# Patient Record
Sex: MALE | Race: WHITE | Employment: FULL TIME | URBAN - METROPOLITAN AREA
[De-identification: names, ages, dates, MRNs, and addresses within clinical notes are randomized per-mention and may not be internally consistent; named-entity substitution may affect disease eponyms.]

---

## 2019-11-15 DIAGNOSIS — Z86.2 PERSONAL HISTORY OF DISEASES OF BLOOD AND BLOOD-FORMING ORGANS: ICD-10-CM

## 2019-11-15 DIAGNOSIS — Z52.001 STEM CELL DONOR: ICD-10-CM

## 2019-11-19 ENCOUNTER — OFFICE VISIT (OUTPATIENT)
Dept: TRANSPLANT | Facility: CLINIC | Age: 64
End: 2019-11-19
Attending: INTERNAL MEDICINE

## 2019-11-19 ENCOUNTER — MEDICAL CORRESPONDENCE (OUTPATIENT)
Dept: TRANSPLANT | Facility: CLINIC | Age: 64
End: 2019-11-19

## 2019-11-19 ENCOUNTER — HOSPITAL ENCOUNTER (OUTPATIENT)
Dept: LAB | Facility: CLINIC | Age: 64
Discharge: HOME | End: 2019-11-19
Attending: INTERNAL MEDICINE | Admitting: INTERNAL MEDICINE

## 2019-11-19 ENCOUNTER — ANCILLARY PROCEDURE (OUTPATIENT)
Dept: GENERAL RADIOLOGY | Facility: CLINIC | Age: 64
End: 2019-11-19
Attending: INTERNAL MEDICINE
Payer: COMMERCIAL

## 2019-11-19 ENCOUNTER — OFFICE VISIT (OUTPATIENT)
Dept: TRANSPLANT | Facility: CLINIC | Age: 64
End: 2019-11-19
Attending: PHYSICIAN ASSISTANT

## 2019-11-19 ENCOUNTER — OFFICE VISIT (OUTPATIENT)
Dept: TRANSPLANT | Facility: CLINIC | Age: 64
End: 2019-11-19
Attending: INTERNAL MEDICINE
Payer: COMMERCIAL

## 2019-11-19 VITALS
DIASTOLIC BLOOD PRESSURE: 73 MMHG | TEMPERATURE: 98.9 F | RESPIRATION RATE: 16 BRPM | HEIGHT: 70 IN | HEART RATE: 69 BPM | OXYGEN SATURATION: 96 % | WEIGHT: 223.33 LBS | BODY MASS INDEX: 31.97 KG/M2 | SYSTOLIC BLOOD PRESSURE: 172 MMHG

## 2019-11-19 VITALS
BODY MASS INDEX: 31.97 KG/M2 | DIASTOLIC BLOOD PRESSURE: 102 MMHG | RESPIRATION RATE: 16 BRPM | SYSTOLIC BLOOD PRESSURE: 169 MMHG | HEART RATE: 66 BPM | TEMPERATURE: 98.9 F | WEIGHT: 223.33 LBS | HEIGHT: 70 IN

## 2019-11-19 DIAGNOSIS — Z86.2 PERSONAL HISTORY OF DISEASES OF BLOOD AND BLOOD-FORMING ORGANS: ICD-10-CM

## 2019-11-19 DIAGNOSIS — Z52.001 DONOR OF STEM CELL: Primary | ICD-10-CM

## 2019-11-19 DIAGNOSIS — Z52.001 STEM CELL DONOR: ICD-10-CM

## 2019-11-19 LAB
ABO + RH BLD: NORMAL
ABO + RH BLD: NORMAL
ALBUMIN SERPL-MCNC: 4.2 G/DL (ref 3.4–5)
ALBUMIN UR-MCNC: NEGATIVE MG/DL
ALP SERPL-CCNC: 79 U/L (ref 40–150)
ALT SERPL W P-5'-P-CCNC: 25 U/L (ref 0–70)
ANION GAP SERPL CALCULATED.3IONS-SCNC: 4 MMOL/L (ref 3–14)
APPEARANCE UR: CLEAR
APTT PPP: 32 SEC (ref 22–37)
AST SERPL W P-5'-P-CCNC: 18 U/L (ref 0–45)
BASOPHILS # BLD AUTO: 0 10E9/L (ref 0–0.2)
BASOPHILS NFR BLD AUTO: 0.8 %
BILIRUB SERPL-MCNC: 0.8 MG/DL (ref 0.2–1.3)
BILIRUB UR QL STRIP: NEGATIVE
BLD GP AB SCN SERPL QL: NORMAL
BLOOD BANK CMNT PATIENT-IMP: NORMAL
BUN SERPL-MCNC: 19 MG/DL (ref 7–30)
CALCIUM SERPL-MCNC: 9.2 MG/DL (ref 8.5–10.1)
CHLORIDE SERPL-SCNC: 107 MMOL/L (ref 94–109)
CO2 SERPL-SCNC: 27 MMOL/L (ref 20–32)
COLOR UR AUTO: NORMAL
CREAT SERPL-MCNC: 0.93 MG/DL (ref 0.66–1.25)
DIFFERENTIAL METHOD BLD: NORMAL
EOSINOPHIL # BLD AUTO: 0.1 10E9/L (ref 0–0.7)
EOSINOPHIL NFR BLD AUTO: 2.1 %
ERYTHROCYTE [DISTWIDTH] IN BLOOD BY AUTOMATED COUNT: 12.8 % (ref 10–15)
GFR SERPL CREATININE-BSD FRML MDRD: 87 ML/MIN/{1.73_M2}
GLUCOSE SERPL-MCNC: 101 MG/DL (ref 70–99)
GLUCOSE UR STRIP-MCNC: NEGATIVE MG/DL
HCT VFR BLD AUTO: 45.7 % (ref 40–53)
HGB BLD-MCNC: 15.2 G/DL (ref 13.3–17.7)
HGB UR QL STRIP: NEGATIVE
IMM GRANULOCYTES # BLD: 0 10E9/L (ref 0–0.4)
IMM GRANULOCYTES NFR BLD: 0.6 %
INR PPP: 0.98 (ref 0.86–1.14)
INTERPRETATION ECG - MUSE: NORMAL
KETONES UR STRIP-MCNC: NEGATIVE MG/DL
LEUKOCYTE ESTERASE UR QL STRIP: NEGATIVE
LYMPHOCYTES # BLD AUTO: 1.2 10E9/L (ref 0.8–5.3)
LYMPHOCYTES NFR BLD AUTO: 25.7 %
MCH RBC QN AUTO: 29.3 PG (ref 26.5–33)
MCHC RBC AUTO-ENTMCNC: 33.3 G/DL (ref 31.5–36.5)
MCV RBC AUTO: 88 FL (ref 78–100)
MONOCYTES # BLD AUTO: 0.4 10E9/L (ref 0–1.3)
MONOCYTES NFR BLD AUTO: 7.2 %
NEUTROPHILS # BLD AUTO: 3.1 10E9/L (ref 1.6–8.3)
NEUTROPHILS NFR BLD AUTO: 63.6 %
NITRATE UR QL: NEGATIVE
NRBC # BLD AUTO: 0 10*3/UL
NRBC BLD AUTO-RTO: 0 /100
PH UR STRIP: 7 PH (ref 5–7)
PLATELET # BLD AUTO: 220 10E9/L (ref 150–450)
POTASSIUM SERPL-SCNC: 4 MMOL/L (ref 3.4–5.3)
PROT SERPL-MCNC: 7.2 G/DL (ref 6.8–8.8)
RBC # BLD AUTO: 5.19 10E12/L (ref 4.4–5.9)
RBC #/AREA URNS AUTO: 0 /HPF (ref 0–2)
SODIUM SERPL-SCNC: 138 MMOL/L (ref 133–144)
SOURCE: NORMAL
SP GR UR STRIP: 1.01 (ref 1–1.03)
SPECIMEN EXP DATE BLD: NORMAL
UROBILINOGEN UR STRIP-MCNC: 0 MG/DL (ref 0–2)
WBC # BLD AUTO: 4.8 10E9/L (ref 4–11)
WBC #/AREA URNS AUTO: 0 /HPF (ref 0–5)

## 2019-11-19 PROCEDURE — 86644 CMV ANTIBODY: CPT | Performed by: INTERNAL MEDICINE

## 2019-11-19 PROCEDURE — 87521 HEPATITIS C PROBE&RVRS TRNSC: CPT | Performed by: INTERNAL MEDICINE

## 2019-11-19 PROCEDURE — 85610 PROTHROMBIN TIME: CPT | Performed by: INTERNAL MEDICINE

## 2019-11-19 PROCEDURE — 86665 EPSTEIN-BARR CAPSID VCA: CPT | Performed by: INTERNAL MEDICINE

## 2019-11-19 PROCEDURE — 85025 COMPLETE CBC W/AUTO DIFF WBC: CPT | Performed by: INTERNAL MEDICINE

## 2019-11-19 PROCEDURE — 93010 ELECTROCARDIOGRAM REPORT: CPT | Mod: ZP | Performed by: INTERNAL MEDICINE

## 2019-11-19 PROCEDURE — G0463 HOSPITAL OUTPT CLINIC VISIT: HCPCS | Mod: 25,ZF

## 2019-11-19 PROCEDURE — 86703 HIV-1/HIV-2 1 RESULT ANTBDY: CPT | Performed by: INTERNAL MEDICINE

## 2019-11-19 PROCEDURE — G0463 HOSPITAL OUTPT CLINIC VISIT: HCPCS | Mod: ZF

## 2019-11-19 PROCEDURE — 86780 TREPONEMA PALLIDUM: CPT | Performed by: INTERNAL MEDICINE

## 2019-11-19 PROCEDURE — 83021 HEMOGLOBIN CHROMOTOGRAPHY: CPT | Performed by: INTERNAL MEDICINE

## 2019-11-19 PROCEDURE — 86901 BLOOD TYPING SEROLOGIC RH(D): CPT | Performed by: INTERNAL MEDICINE

## 2019-11-19 PROCEDURE — 86900 BLOOD TYPING SEROLOGIC ABO: CPT | Performed by: INTERNAL MEDICINE

## 2019-11-19 PROCEDURE — 86695 HERPES SIMPLEX TYPE 1 TEST: CPT | Performed by: INTERNAL MEDICINE

## 2019-11-19 PROCEDURE — 87535 HIV-1 PROBE&REVERSE TRNSCRPJ: CPT | Performed by: INTERNAL MEDICINE

## 2019-11-19 PROCEDURE — 36415 COLL VENOUS BLD VENIPUNCTURE: CPT

## 2019-11-19 PROCEDURE — 85730 THROMBOPLASTIN TIME PARTIAL: CPT | Performed by: INTERNAL MEDICINE

## 2019-11-19 PROCEDURE — 86687 HTLV-I ANTIBODY: CPT | Performed by: INTERNAL MEDICINE

## 2019-11-19 PROCEDURE — 86704 HEP B CORE ANTIBODY TOTAL: CPT | Performed by: INTERNAL MEDICINE

## 2019-11-19 PROCEDURE — 40000803 ZZHCL STATISTIC DNA ISOL HIGH PURITY: Performed by: INTERNAL MEDICINE

## 2019-11-19 PROCEDURE — 86850 RBC ANTIBODY SCREEN: CPT | Performed by: INTERNAL MEDICINE

## 2019-11-19 PROCEDURE — 86753 PROTOZOA ANTIBODY NOS: CPT | Performed by: INTERNAL MEDICINE

## 2019-11-19 PROCEDURE — 80053 COMPREHEN METABOLIC PANEL: CPT | Performed by: INTERNAL MEDICINE

## 2019-11-19 PROCEDURE — 86696 HERPES SIMPLEX TYPE 2 TEST: CPT | Performed by: INTERNAL MEDICINE

## 2019-11-19 PROCEDURE — 86803 HEPATITIS C AB TEST: CPT | Performed by: INTERNAL MEDICINE

## 2019-11-19 PROCEDURE — 87798 DETECT AGENT NOS DNA AMP: CPT | Performed by: INTERNAL MEDICINE

## 2019-11-19 PROCEDURE — 81001 URINALYSIS AUTO W/SCOPE: CPT | Performed by: INTERNAL MEDICINE

## 2019-11-19 PROCEDURE — 40000268 ZZH STATISTIC NO CHARGES: Mod: ZF

## 2019-11-19 PROCEDURE — 87516 HEPATITIS B DNA AMP PROBE: CPT | Performed by: INTERNAL MEDICINE

## 2019-11-19 PROCEDURE — 87340 HEPATITIS B SURFACE AG IA: CPT | Performed by: INTERNAL MEDICINE

## 2019-11-19 ASSESSMENT — MIFFLIN-ST. JEOR
SCORE: 1818.63
SCORE: 1818.63

## 2019-11-19 ASSESSMENT — PAIN SCALES - GENERAL: PAINLEVEL: NO PAIN (0)

## 2019-11-19 NOTE — NURSING NOTE
EKG done, patient tolerated it well. Results transmitted to [facility]

## 2019-11-19 NOTE — PROGRESS NOTES
Discussed GCSF and apheresis procedure for PBSC donation. Provided written handouts and reviewed donor consent, apheresis informational sheet, collection calender and side effects associated with GCSF and apheresis. Patient verbalized understanding. All questions were answered.

## 2019-11-19 NOTE — PROGRESS NOTES
BMT Donor History and Physical    Deangelo Fish MRN# [id number]   Age: 64 year old YOB: 1955            Assessment and Plan     Potential related donor for brother Shai Fish who has AML.  Deangelo will donate peripheral blood stems cells.  G-CSF and Apheresis  has been fully reviewed with the patient.The  donor protocol, risks, and benefits,  and consent form were reviewed with the patient, and all questions were answered before the consent was signed. A copy of the consent was provided to the patient. The labs were reviewed, imaging and EKG were reviewed if applicable.  All labs and EKG and CXR are okay. Health history and physical exam completed. Infectious disease testing is pending and must be reviewed before the patient meets criteria to be a donor.  Only issue with patient is he is hypertensive.  States no history of hypertension.  Instructed that when he goes home today he needs to find a provider this week, monitor his blood pressure and treat if provider finds still hypertensive.  Discussed with Nurse coordinator who will follow up with donor about his elevated blood pressure.  Patient signed BMT research consent form MT 2018-05: The BMT research consent form, including the purpose of the study, details of the consent form, risks, and benefits, was reviewed with Deangelo Fish, and all questions were answered before Deangelo Fish signed the consent form.   Addendum: 11/23/2019  I have reviewed the donor's H&P and the above evaluations and approve the collection or harvest of hematopoietic stem cells from the identified donor for use by the identified recipient.  I have reviewed the infectious disease testing and these are negative and do not prevent donation.    This donor does qualify as an acceptable donor. He has agreed to see a local provider for blood pressure evaluation( has high blood pressure during his H/P) prior to undergoing apheresis  Rosalinda Wilkinson PA-C       HPI: Patient has no  concerns today or questions.  Transfusions: No  Hepatitis: No  Currently pregnant or any chance may be pregnant: Not applicable  Currently breast feeding: No  Currently using a method of birth control: No Not applicable: Patient is male  Number of previous pregnancies: Male patient  Alcohol use: occasional beer  Tobacco use: No tobacco use  Recreational drugs: No  Nonmedical percutaneous drug use: No IV drug use  Recent immunizations or planning on getting any immunizations: No recent immunizations  Ear or body piercing in the last 12 months: No  New tattoo in recent 12 months:No  History of cancer or blood disease: No history of cancer or blood disease in yourself  Known risk factors:          Past Medical History:   This patient has no significant past medical history.         Past Surgical History:    This patient has no significant past surgical history.  He had his tonsils removed.  He had a hand repair post cutting his hand and thinks it is his left hand.         Social History:    [place]. [employer]. .  2 children.         Family History:   Brother has AML.  Mother had liver Cancer. Father passed away from heart issue, had rheumatic fever as a child and damaged his heart.  Oldest brother had heart issue but not sure what.         Immunizations:   Not sure.  Had tetanus shot sometime ago.         Allergies:   No known drug allergies         Medications:   Currently does not take any medication including over the counter.         Review of Systems:   CONSTITUTIONAL: NEGATIVE for fever, chills, change in weight  INTEGUMENTARY/SKIN: NEGATIVE for worrisome rashes, moles or lesions  EYES: NEGATIVE for vision changes or irritation  ENT/MOUTH: NEGATIVE for ear, mouth and throat problems  RESP: NEGATIVE for significant cough or SOB  BREAST: NEGATIVE for masses, tenderness or discharge  CV: NEGATIVE for chest pain, palpitations or peripheral edema  GI: NEGATIVE for nausea, abdominal pain, heartburn,  or change in bowel habits  : NEGATIVE for frequency, dysuria, or hematuria  MUSCULOSKELETAL: NEGATIVE for significant arthralgias or myalgia  NEURO: NEGATIVE for weakness, dizziness or paresthesias  ENDOCRINE: NEGATIVE for temperature intolerance, skin/hair changes  HEME: NEGATIVE for bleeding problems  PSYCHIATRIC: NEGATIVE for changes in mood or affect         Physical Exam:   Vitals were reviewed      Constitutional:   awake, alert, cooperative, no apparent distress, and appears stated age   Eyes:   Lids and lashes normal, pupils equal, round and reactive to light, extra ocular muscles intact, sclera clear, conjunctiva normal   ENT:   Normocephalic, without obvious abnormality, atraumatic, sinuses nontender on palpation, external ears without lesions, oral pharynx with moist mucous membranes, tonsils without erythema or exudates, gums normal and good dentition.   Neck:   Supple, symmetrical, trachea midline, no adenopathy, thyroid symmetric, not enlarged and no tenderness, skin normal   Hematologic / Lymphatic:   no cervical lymphadenopathy and no supraclavicular lymphadenopathy   Back:   Symmetric, no curvature, spinous processes are non-tender on palpation, paraspinous muscles are non-tender on palpation, no costal vertebral tenderness   Lungs:   No increased work of breathing, good air exchange, clear to auscultation bilaterally, no crackles or wheezing   Cardiovascular:   Normal apical impulse, regular rate and rhythm, normal S1 and S2, no S3 or S4, and no murmur noted   Abdomen:   No scars, normal bowel sounds, soft, non-distended, non-tender, no masses palpated, no hepatosplenomegally   Chest / Breast:   Breasts symmetrical, skin without lesion(s), no nipple retraction or dimpling, no nipple discharge, no masses palpated, no axillary or supraclavicular adenopathy         Musculoskeletal:   There is no redness, warmth, or swelling of the joints.  Full range of motion noted.  Motor strength is 5 out of 5  all extremities bilaterally.  Tone is normal.   Neurologic:   Awake, alert, oriented to name, place and time.  Cranial nerves II-XII are grossly intact.  Motor is 5 out of 5 bilaterally.  Cerebellar finger to nose, heel to shin intact.  Sensory is intact.  Babinski down going, Romberg negative, and gait is normal.   Neuropsychiatric:   General: normal, calm and normal eye contact   Skin:   no bruising or bleeding, normal skin color, texture, turgor and no redness, warmth, or swelling            Data:   All laboratory data reviewed  All cardiac studies reviewed by me.  All imaging studies reviewed by me.          Rosalinda Wilkinson PA-C

## 2019-11-19 NOTE — NURSING NOTE
"Oncology Rooming Note    November 19, 2019 2:32 PM   Deangelo Fish is a 64 year old male who presents for:    Chief Complaint   Patient presents with     RECHECK     Pt here for Related Donor Workup and Labs. Pt is the Stem Cell Donor for his Brother.      Initial Vitals: BP (!) 172/73   Pulse 69   Temp 98.9  F (37.2  C) (Oral)   Resp 16   Ht 1.785 m (5' 10.28\")   Wt 101.3 kg (223 lb 5.2 oz)   SpO2 96%   BMI 31.79 kg/m   Estimated body mass index is 31.79 kg/m  as calculated from the following:    Height as of this encounter: 1.785 m (5' 10.28\").    Weight as of this encounter: 101.3 kg (223 lb 5.2 oz). Body surface area is 2.24 meters squared.  No Pain (0) Comment: Data Unavailable   No LMP for male patient.  Allergies reviewed: Yes  Medications reviewed: Yes    Medications: Medication refills not needed today.  Pharmacy name entered into EPIC: Data Unavailable    Clinical concerns: Pt here for Related Donor Workup. Pt is the stem cell donor for his Brother. Pt feeling well today; however, BP elevated upon check-in at 165/103. Pt states he hasn't seen a doctor in 12+ years so is unsure if his BP tends to run high. One hour recheck of BP resulted at 172/73. ROVERTO Clayton notified who indicated that Pt needs to follow up with a Primary Care doctor back home. Hypertension is not something we would be following here as Pt will not be a long term Pt of ours. Pt encouraged to make an appointment at his home clinic for further follow-up regarding elevated BP.    Workup Consents explained and signed prior to drawing labs, collecting UA, performing EKG, etc. Workup schedule for today reviewed with Pt. Pt with no further questions at this time. Pt does not take any medications including blood thinners.          Josephine Hall RN              "

## 2019-11-19 NOTE — CONSULTS
"APHERESIS INITIAL CONSULT CHECKLIST    Current Encounter Information  Current Encounter Information: Reason for Visit, Allergies and Current Meds  Procedure Requested: MNC/PBSC Collection  History of: (Reason for Apheresis): allo collection    Access Assessment  Access Assessment  Vein Assessment:  Veins are adequate: Yes  Needs a catheter placed for Apheresis?: No    Vital Signs  Vital Signs  BP: (!) 169/102(Dr. Gallegos notified of high blood pressure)  Pulse: 66  Temp: 98.9  F (37.2  C)  Temp src: Oral  Resp: 16  Height: 178.5 cm (5' 10.28\")  Weight: 101.3 kg (223 lb 5.2 oz)    Reviewed   Review With Patient  Have you read the brochure Getting ready for Apheresis?: Yes  Have you had any invasive procedures, surgery, biopsy, bleeding in the last month?: No  Review medications and allergies: Yes(no medications.  no allergies)  Have you ever been transfused?: No  Do you require pre-medication for blood products?: (N/A)  Patient given tour of the unit: Yes    Additional Information  Notes, needs and time spent with patient  Explain procedure, side effects or reactions, instructions: Yes  Patient has special need?: No  Discussed the need to follow a low fat diet the day before and days of collection.  Encouraged to wear loose fitting clothing the days of collection.  All questions were answered.  Patient met with Dr. Gallegos from transfusion medicine  Time spent: 30 minutes spent face to face reviewing donor card, evaluating veins and explaining procedure      "

## 2019-11-20 LAB
CMV IGG SERPL QL IA: <0.2 AI (ref 0–0.8)
COPATH REPORT: NORMAL
EBV VCA IGG SER QL IA: 5.4 AI (ref 0–0.8)
HSV1 IGG SERPL QL IA: >8 AI (ref 0–0.8)
HSV2 IGG SERPL QL IA: <0.2 AI (ref 0–0.8)

## 2019-11-22 LAB
DONOR CYTOMEGALOVIRUS ABY: NONREACTIVE
DONOR HEP B CORE ABY: NONREACTIVE
DONOR HEP B SURF AGN: NONREACTIVE
DONOR HEPATITIS C ABY: NONREACTIVE
DONOR HTLV 1&2 ANTIBODY: NONREACTIVE
DONOR TREPONEMA PAL ABY: NONREACTIVE
HIV1+2 AB SERPL QL IA: NONREACTIVE
LAB SCANNED RESULT: NORMAL
MPX SERIES: NONREACTIVE
T CRUZI AB SER DONR QL: NONREACTIVE
WNV RNA SPEC QL NAA+PROBE: NONREACTIVE

## 2019-11-23 PROBLEM — Z52.001 DONOR OF STEM CELL: Status: ACTIVE | Noted: 2019-11-23

## 2019-11-23 RX ORDER — HEPARIN SODIUM (PORCINE) LOCK FLUSH IV SOLN 100 UNIT/ML 100 UNIT/ML
5 SOLUTION INTRAVENOUS
Status: CANCELLED | OUTPATIENT
Start: 2019-11-23

## 2019-11-23 RX ORDER — HEPARIN SODIUM,PORCINE 10 UNIT/ML
5 VIAL (ML) INTRAVENOUS
Status: CANCELLED | OUTPATIENT
Start: 2019-11-23

## 2019-11-25 LAB
ASBT COMMENTS: NORMAL
ASBTTEST METHOD: NORMAL
DRSBT COMMENTS: NORMAL
DRSBTTEST METHOD: NORMAL
SBTA* LOCUS NMDP: NORMAL
SBTA* LOCUS: NORMAL
SBTA* NMDP - FCIS: NORMAL
SBTA*: NORMAL
SBTB* LOCUS NMDP: NORMAL
SBTB* LOCUS: NORMAL
SBTB*: NORMAL
SBTB*NMDP: NORMAL
SBTC* LOCUS NMDP: NORMAL
SBTC* LOCUS: NORMAL
SBTC* NMDP: NORMAL
SBTC*: NORMAL
SBTDQB1*: NORMAL
SBTDQB1*LOCUS NMDP: NORMAL
SBTDQB1*LOCUS: NORMAL
SBTDQB1*NMDP: NORMAL
SBTDRB1* LOCUS - FCIS: NORMAL
SBTDRB1*: NORMAL

## 2019-12-17 DIAGNOSIS — Z52.001 DONOR OF STEM CELL: Primary | ICD-10-CM

## 2019-12-19 ENCOUNTER — CARE COORDINATION (OUTPATIENT)
Dept: TRANSPLANT | Facility: CLINIC | Age: 64
End: 2019-12-19

## 2019-12-19 NOTE — PROGRESS NOTES
Writer called and confirmed appointment dates and times with Deangelo for upcoming PBSC donation.    Deangelo will begin GCSF on 12/30.  Deangelo understands dates, times procedures and instructions provided and no further questions at this time.

## 2019-12-26 ENCOUNTER — CARE COORDINATION (OUTPATIENT)
Dept: ONCOLOGY | Facility: CLINIC | Age: 64
End: 2019-12-26

## 2019-12-26 NOTE — PROGRESS NOTES
Writer called and confirmed appointment dates and times with  for upcoming PBSC donation.  Deangelo will begin GCSF on  and is also scheduled for a lab appointment to repeat Infectious Disease Markers (IDM) -  since last drawn.  Deangelo understands dates, times procedures and instructions provided and no further questions at this time.

## 2019-12-30 ENCOUNTER — OFFICE VISIT (OUTPATIENT)
Dept: TRANSPLANT | Facility: CLINIC | Age: 64
End: 2019-12-30
Attending: INTERNAL MEDICINE

## 2019-12-30 ENCOUNTER — ONCOLOGY VISIT (OUTPATIENT)
Dept: TRANSPLANT | Facility: CLINIC | Age: 64
End: 2019-12-30
Attending: INTERNAL MEDICINE

## 2019-12-30 VITALS
HEART RATE: 77 BPM | TEMPERATURE: 97.7 F | WEIGHT: 218.2 LBS | OXYGEN SATURATION: 96 % | BODY MASS INDEX: 31.06 KG/M2 | SYSTOLIC BLOOD PRESSURE: 155 MMHG | DIASTOLIC BLOOD PRESSURE: 91 MMHG

## 2019-12-30 DIAGNOSIS — Z52.001 DONOR OF STEM CELL: Primary | ICD-10-CM

## 2019-12-30 PROCEDURE — 87521 HEPATITIS C PROBE&RVRS TRNSC: CPT | Performed by: INTERNAL MEDICINE

## 2019-12-30 PROCEDURE — 86753 PROTOZOA ANTIBODY NOS: CPT | Performed by: INTERNAL MEDICINE

## 2019-12-30 PROCEDURE — G0463 HOSPITAL OUTPT CLINIC VISIT: HCPCS | Mod: ZF

## 2019-12-30 PROCEDURE — 86780 TREPONEMA PALLIDUM: CPT | Performed by: INTERNAL MEDICINE

## 2019-12-30 PROCEDURE — 86644 CMV ANTIBODY: CPT | Performed by: INTERNAL MEDICINE

## 2019-12-30 PROCEDURE — 87516 HEPATITIS B DNA AMP PROBE: CPT | Performed by: INTERNAL MEDICINE

## 2019-12-30 PROCEDURE — 86687 HTLV-I ANTIBODY: CPT | Performed by: INTERNAL MEDICINE

## 2019-12-30 PROCEDURE — 87535 HIV-1 PROBE&REVERSE TRNSCRPJ: CPT | Performed by: INTERNAL MEDICINE

## 2019-12-30 PROCEDURE — 36415 COLL VENOUS BLD VENIPUNCTURE: CPT

## 2019-12-30 PROCEDURE — 86803 HEPATITIS C AB TEST: CPT | Performed by: INTERNAL MEDICINE

## 2019-12-30 PROCEDURE — 86704 HEP B CORE ANTIBODY TOTAL: CPT | Performed by: INTERNAL MEDICINE

## 2019-12-30 PROCEDURE — 96372 THER/PROPH/DIAG INJ SC/IM: CPT

## 2019-12-30 PROCEDURE — 87340 HEPATITIS B SURFACE AG IA: CPT | Performed by: INTERNAL MEDICINE

## 2019-12-30 PROCEDURE — 86703 HIV-1/HIV-2 1 RESULT ANTBDY: CPT | Performed by: INTERNAL MEDICINE

## 2019-12-30 PROCEDURE — 25000128 H RX IP 250 OP 636: Mod: ZF | Performed by: INTERNAL MEDICINE

## 2019-12-30 PROCEDURE — 87798 DETECT AGENT NOS DNA AMP: CPT | Performed by: INTERNAL MEDICINE

## 2019-12-30 RX ORDER — HEPARIN SODIUM,PORCINE 10 UNIT/ML
5 VIAL (ML) INTRAVENOUS
Status: CANCELLED | OUTPATIENT
Start: 2020-02-05

## 2019-12-30 RX ADMIN — FILGRASTIM 1080 MCG: 480 INJECTION, SOLUTION INTRAVENOUS; SUBCUTANEOUS at 08:05

## 2019-12-30 ASSESSMENT — PAIN SCALES - GENERAL: PAINLEVEL: NO PAIN (0)

## 2019-12-30 NOTE — NURSING NOTE
"Oncology Rooming Note    December 30, 2019 7:56 AM   Deangelo Fish is a 64 year old male who presents for:    Chief Complaint   Patient presents with     RECHECK     Return: Stem Cell Donor     Initial Vitals: BP (!) 155/91   Pulse 77   Temp 97.7  F (36.5  C) (Oral)   Wt 99 kg (218 lb 3.2 oz)   SpO2 96%   BMI 31.06 kg/m   Estimated body mass index is 31.06 kg/m  as calculated from the following:    Height as of 11/19/19: 1.785 m (5' 10.28\").    Weight as of this encounter: 99 kg (218 lb 3.2 oz). Body surface area is 2.22 meters squared.  No Pain (0) Comment: Data Unavailable   No LMP for male patient.  Allergies reviewed: Yes  Medications reviewed: Yes    Medications: Medication refills not needed today.  Pharmacy name entered into EPIC: Data Unavailable    Clinical concerns: None       Franny Jones CMA              "

## 2019-12-30 NOTE — PROGRESS NOTES
BMT Clinic Note     Mr. Fish is a 65yo male who is undergoing GCSF primed stem cell collections for his brother.      HPI:  Here to begin GCSF in preparation for stem cell collections.  Feeling good.  No fever, chills, cough or congestion.  No N/V/D.  No pain or bleeding.    Review of Systems: 10 point ROS negative except as noted above.    Physical Exam:   Blood pressure (!) 155/91, pulse 77, temperature 97.7  F (36.5  C), temperature source Oral, weight 99 kg (218 lb 3.2 oz), SpO2 96 %.  General: NAD   Eyes: : ELVI, sclera anicteric   Nose/Mouth/Throat: OP clear, buccal mucosa moist, no ulcerations   Lungs: CTA bilaterally  Cardiovascular: RRR, no M/R/G   Abdominal/Rectal: +BS, soft, NT, ND  Lymphatics: no edema  Skin: no rashes or petechaie  Neuro: A&O   Labs:  Lab Results   Component Value Date    WBC 4.8 11/19/2019    ANEU 3.1 11/19/2019    HGB 15.2 11/19/2019    HCT 45.7 11/19/2019     11/19/2019     11/19/2019    POTASSIUM 4.0 11/19/2019    CHLORIDE 107 11/19/2019    CO2 27 11/19/2019     (H) 11/19/2019    BUN 19 11/19/2019    CR 0.93 11/19/2019    INR 0.98 11/19/2019     Assessment and Plan:  Healthy donor here to begin GCSF in preparation for stem cell collections.  1.  BMT: Begin GCSF today & cont daily through collections.  Begin collections on 1/3  2.  ID:  Per [facility] repeat IDM's to be drawn today  3.  CV:  HTN.  His PCP put him on an antihypertensive, but he doesn't know the name of it.    Maura Conde

## 2019-12-30 NOTE — NURSING NOTE
Neupogen 1080 MCG. Sub-Q injection right arm . See MAR. Pt. Tolerated well.    Franny Jones, CMA

## 2019-12-31 ENCOUNTER — OFFICE VISIT (OUTPATIENT)
Dept: TRANSPLANT | Facility: CLINIC | Age: 64
End: 2019-12-31
Attending: INTERNAL MEDICINE

## 2019-12-31 VITALS
BODY MASS INDEX: 31.28 KG/M2 | OXYGEN SATURATION: 97 % | HEIGHT: 70 IN | DIASTOLIC BLOOD PRESSURE: 78 MMHG | TEMPERATURE: 99.2 F | RESPIRATION RATE: 16 BRPM | WEIGHT: 218.5 LBS | HEART RATE: 82 BPM | SYSTOLIC BLOOD PRESSURE: 149 MMHG

## 2019-12-31 DIAGNOSIS — Z52.001 DONOR OF STEM CELL: Primary | ICD-10-CM

## 2019-12-31 PROCEDURE — 25000128 H RX IP 250 OP 636: Mod: ZF | Performed by: INTERNAL MEDICINE

## 2019-12-31 PROCEDURE — 96372 THER/PROPH/DIAG INJ SC/IM: CPT

## 2019-12-31 RX ORDER — HEPARIN SODIUM,PORCINE 10 UNIT/ML
5 VIAL (ML) INTRAVENOUS
Status: CANCELLED | OUTPATIENT
Start: 2020-02-06

## 2019-12-31 RX ADMIN — FILGRASTIM 1080 MCG: 480 INJECTION, SOLUTION INTRAVENOUS; SUBCUTANEOUS at 07:39

## 2019-12-31 ASSESSMENT — MIFFLIN-ST. JEOR: SCORE: 1796.81

## 2019-12-31 ASSESSMENT — PAIN SCALES - GENERAL: PAINLEVEL: NO PAIN (0)

## 2020-01-01 ENCOUNTER — OFFICE VISIT (OUTPATIENT)
Dept: TRANSPLANT | Facility: CLINIC | Age: 65
End: 2020-01-01
Attending: INTERNAL MEDICINE
Payer: COMMERCIAL

## 2020-01-01 DIAGNOSIS — Z52.001 DONOR OF STEM CELL: Primary | ICD-10-CM

## 2020-01-01 PROCEDURE — 96372 THER/PROPH/DIAG INJ SC/IM: CPT

## 2020-01-01 PROCEDURE — 25000128 H RX IP 250 OP 636: Mod: ZF | Performed by: INTERNAL MEDICINE

## 2020-01-01 RX ORDER — HEPARIN SODIUM,PORCINE 10 UNIT/ML
5 VIAL (ML) INTRAVENOUS
Status: CANCELLED | OUTPATIENT
Start: 2020-01-02

## 2020-01-01 RX ADMIN — FILGRASTIM 1080 MCG: 480 INJECTION, SOLUTION INTRAVENOUS; SUBCUTANEOUS at 07:49

## 2020-01-01 NOTE — PROGRESS NOTES
Infusion Nursing Note:  Deangelo Landonnenasamantha presents today for related donor here for GCSF inj.    Patient seen by provider today: No   present during visit today: Not Applicable.    Note: denies pain.    Intravenous Access:  No Intravenous access/labs at this visit.    Treatment Conditions:  Not Applicable.      Post Infusion Assessment:  Patient tolerated injection without incident.       Discharge Plan:   Patient and/or family verbalized understanding of discharge instructions and all questions answered.    Simona Alarcon RN

## 2020-01-02 ENCOUNTER — OFFICE VISIT (OUTPATIENT)
Dept: TRANSPLANT | Facility: CLINIC | Age: 65
End: 2020-01-02
Attending: INTERNAL MEDICINE
Payer: COMMERCIAL

## 2020-01-02 VITALS
OXYGEN SATURATION: 96 % | HEART RATE: 76 BPM | TEMPERATURE: 98 F | DIASTOLIC BLOOD PRESSURE: 97 MMHG | BODY MASS INDEX: 31.1 KG/M2 | SYSTOLIC BLOOD PRESSURE: 156 MMHG | WEIGHT: 218.48 LBS

## 2020-01-02 DIAGNOSIS — Z52.001 DONOR OF STEM CELL: Primary | ICD-10-CM

## 2020-01-02 PROCEDURE — 96372 THER/PROPH/DIAG INJ SC/IM: CPT

## 2020-01-02 PROCEDURE — 25000128 H RX IP 250 OP 636: Mod: ZF | Performed by: INTERNAL MEDICINE

## 2020-01-02 RX ORDER — HEPARIN SODIUM (PORCINE) LOCK FLUSH IV SOLN 100 UNIT/ML 100 UNIT/ML
1 SOLUTION INTRAVENOUS
Status: CANCELLED | OUTPATIENT
Start: 2020-01-02

## 2020-01-02 RX ORDER — HEPARIN SODIUM,PORCINE 10 UNIT/ML
5 VIAL (ML) INTRAVENOUS
Status: CANCELLED | OUTPATIENT
Start: 2020-01-03

## 2020-01-02 RX ADMIN — FILGRASTIM 1080 MCG: 480 INJECTION, SOLUTION INTRAVENOUS; SUBCUTANEOUS at 07:15

## 2020-01-02 ASSESSMENT — PAIN SCALES - GENERAL: PAINLEVEL: NO PAIN (0)

## 2020-01-02 NOTE — NURSING NOTE
Neupogen 1080 MCG. Sub-Q injection Left arm . See MAR. Pt. Tolerated well.    Franny Jones, CMA

## 2020-01-03 ENCOUNTER — HOSPITAL ENCOUNTER (OUTPATIENT)
Dept: LAB | Facility: CLINIC | Age: 65
Discharge: HOME | End: 2020-01-03
Payer: COMMERCIAL

## 2020-01-03 ENCOUNTER — ONCOLOGY VISIT (OUTPATIENT)
Dept: TRANSPLANT | Facility: CLINIC | Age: 65
End: 2020-01-03
Attending: STUDENT IN AN ORGANIZED HEALTH CARE EDUCATION/TRAINING PROGRAM

## 2020-01-03 VITALS
BODY MASS INDEX: 31.1 KG/M2 | DIASTOLIC BLOOD PRESSURE: 81 MMHG | RESPIRATION RATE: 16 BRPM | WEIGHT: 218.48 LBS | TEMPERATURE: 98.3 F | HEART RATE: 85 BPM | SYSTOLIC BLOOD PRESSURE: 144 MMHG

## 2020-01-03 DIAGNOSIS — Z52.001 DONOR OF STEM CELL: Primary | ICD-10-CM

## 2020-01-03 LAB
ABO + RH BLD: NORMAL
ABO + RH BLD: NORMAL
BASOPHILS # BLD AUTO: 0 10E9/L (ref 0–0.2)
BASOPHILS # BLD AUTO: 0.5 10E9/L (ref 0–0.2)
BASOPHILS NFR BLD AUTO: 0 %
BASOPHILS NFR BLD AUTO: 0.9 %
BLD GP AB SCN SERPL QL: NORMAL
BLOOD BANK CMNT PATIENT-IMP: NORMAL
CD34 CELLS # SPEC: 146 /UL
CD34 CELLS NFR SPEC: 0.19 %
CELL THERAPY PRODUCT NUMBER: NORMAL
DIFFERENTIAL METHOD BLD: ABNORMAL
DIFFERENTIAL METHOD BLD: ABNORMAL
DONOR CYTOMEGALOVIRUS ABY: NONREACTIVE
DONOR HEP B CORE ABY: NONREACTIVE
DONOR HEP B SURF AGN: NONREACTIVE
DONOR HEPATITIS C ABY: NONREACTIVE
DONOR HTLV 1&2 ANTIBODY: NONREACTIVE
DONOR TREPONEMA PAL ABY: NONREACTIVE
EOSINOPHIL # BLD AUTO: 0 10E9/L (ref 0–0.7)
EOSINOPHIL # BLD AUTO: 1.5 10E9/L (ref 0–0.7)
EOSINOPHIL NFR BLD AUTO: 0 %
EOSINOPHIL NFR BLD AUTO: 2.6 %
ERYTHROCYTE [DISTWIDTH] IN BLOOD BY AUTOMATED COUNT: 13.2 % (ref 10–15)
ERYTHROCYTE [DISTWIDTH] IN BLOOD BY AUTOMATED COUNT: 13.2 % (ref 10–15)
HCT VFR BLD AUTO: 41.6 % (ref 40–53)
HCT VFR BLD AUTO: 43.1 % (ref 40–53)
HGB BLD-MCNC: 13.9 G/DL (ref 13.3–17.7)
HGB BLD-MCNC: 14.6 G/DL (ref 13.3–17.7)
HIV1+2 AB SERPL QL IA: NONREACTIVE
IFC SPECIMEN: NORMAL
LYMPHOCYTES # BLD AUTO: 4.9 10E9/L (ref 0.8–5.3)
LYMPHOCYTES # BLD AUTO: 5.4 10E9/L (ref 0.8–5.3)
LYMPHOCYTES NFR BLD AUTO: 7.8 %
LYMPHOCYTES NFR BLD AUTO: 8.5 %
MCH RBC QN AUTO: 29 PG (ref 26.5–33)
MCH RBC QN AUTO: 29.7 PG (ref 26.5–33)
MCHC RBC AUTO-ENTMCNC: 33.4 G/DL (ref 31.5–36.5)
MCHC RBC AUTO-ENTMCNC: 33.9 G/DL (ref 31.5–36.5)
MCV RBC AUTO: 87 FL (ref 78–100)
MCV RBC AUTO: 88 FL (ref 78–100)
METAMYELOCYTES # BLD: 2 10E9/L
METAMYELOCYTES NFR BLD MANUAL: 3.4 %
MONOCYTES # BLD AUTO: 1.5 10E9/L (ref 0–1.3)
MONOCYTES # BLD AUTO: 4.8 10E9/L (ref 0–1.3)
MONOCYTES NFR BLD AUTO: 2.6 %
MONOCYTES NFR BLD AUTO: 6.9 %
MPX SERIES: NONREACTIVE
MYELOCYTES # BLD: 0.6 10E9/L
MYELOCYTES NFR BLD MANUAL: 0.9 %
NEUTROPHILS # BLD AUTO: 47.4 10E9/L (ref 1.6–8.3)
NEUTROPHILS # BLD AUTO: 57.9 10E9/L (ref 1.6–8.3)
NEUTROPHILS NFR BLD AUTO: 82 %
NEUTROPHILS NFR BLD AUTO: 83.5 %
PLATELET # BLD AUTO: 170 10E9/L (ref 150–450)
PLATELET # BLD AUTO: 205 10E9/L (ref 150–450)
PLATELET # BLD EST: ABNORMAL 10*3/UL
PLATELET # BLD EST: ABNORMAL 10*3/UL
POIKILOCYTOSIS BLD QL SMEAR: SLIGHT
PROMYELOCYTES # BLD MANUAL: 0.6 10E9/L
PROMYELOCYTES NFR BLD MANUAL: 0.9 %
RBC # BLD AUTO: 4.8 10E12/L (ref 4.4–5.9)
RBC # BLD AUTO: 4.92 10E12/L (ref 4.4–5.9)
RBC MORPH BLD: NORMAL
SPECIMEN EXP DATE BLD: NORMAL
T CRUZI AB SER DONR QL: NONREACTIVE
VIABLE CD34 CELLS NFR FLD: 96.63 %
WBC # BLD AUTO: 57.8 10E9/L (ref 4–11)
WBC # BLD AUTO: 69.4 10E9/L (ref 4–11)
WNV RNA SPEC QL NAA+PROBE: NONREACTIVE

## 2020-01-03 PROCEDURE — 86900 BLOOD TYPING SEROLOGIC ABO: CPT | Performed by: PATHOLOGY

## 2020-01-03 PROCEDURE — 86367 STEM CELLS TOTAL COUNT: CPT | Performed by: PATHOLOGY

## 2020-01-03 PROCEDURE — 86850 RBC ANTIBODY SCREEN: CPT | Performed by: PATHOLOGY

## 2020-01-03 PROCEDURE — 40000141 ZZH STATISTIC PERIPHERAL IV START W/O US GUIDANCE: Mod: ZF

## 2020-01-03 PROCEDURE — 96372 THER/PROPH/DIAG INJ SC/IM: CPT | Mod: XS

## 2020-01-03 PROCEDURE — 25000128 H RX IP 250 OP 636: Mod: ZF | Performed by: STUDENT IN AN ORGANIZED HEALTH CARE EDUCATION/TRAINING PROGRAM

## 2020-01-03 PROCEDURE — 25000128 H RX IP 250 OP 636: Mod: ZF | Performed by: INTERNAL MEDICINE

## 2020-01-03 PROCEDURE — 86901 BLOOD TYPING SEROLOGIC RH(D): CPT | Performed by: PATHOLOGY

## 2020-01-03 PROCEDURE — 25000125 ZZHC RX 250: Mod: ZF | Performed by: STUDENT IN AN ORGANIZED HEALTH CARE EDUCATION/TRAINING PROGRAM

## 2020-01-03 PROCEDURE — 38205 HARVEST ALLOGENEIC STEM CELL: CPT | Mod: ZF

## 2020-01-03 PROCEDURE — G0463 HOSPITAL OUTPT CLINIC VISIT: HCPCS | Mod: 25

## 2020-01-03 PROCEDURE — 36415 COLL VENOUS BLD VENIPUNCTURE: CPT

## 2020-01-03 PROCEDURE — 85025 COMPLETE CBC W/AUTO DIFF WBC: CPT | Performed by: PATHOLOGY

## 2020-01-03 RX ORDER — HEPARIN SODIUM,PORCINE 10 UNIT/ML
5 VIAL (ML) INTRAVENOUS
Status: CANCELLED | OUTPATIENT
Start: 2020-01-03

## 2020-01-03 RX ORDER — HEPARIN SODIUM (PORCINE) LOCK FLUSH IV SOLN 100 UNIT/ML 100 UNIT/ML
1 SOLUTION INTRAVENOUS
Status: COMPLETED | OUTPATIENT
Start: 2020-01-03 | End: 2020-01-03

## 2020-01-03 RX ORDER — CHLORTHALIDONE 25 MG/1
25 TABLET ORAL DAILY
COMMUNITY

## 2020-01-03 RX ORDER — HEPARIN SODIUM (PORCINE) LOCK FLUSH IV SOLN 100 UNIT/ML 100 UNIT/ML
1 SOLUTION INTRAVENOUS
Status: CANCELLED | OUTPATIENT
Start: 2020-01-03

## 2020-01-03 RX ADMIN — FILGRASTIM 1080 MCG: 480 INJECTION, SOLUTION INTRAVENOUS; SUBCUTANEOUS at 08:48

## 2020-01-03 RX ADMIN — Medication 1 ML: at 14:10

## 2020-01-03 RX ADMIN — ANTICOAGULANT CITRATE DEXTROSE SOLUTION FORMULA A 1345 ML: 12.25; 11; 3.65 SOLUTION INTRAVENOUS at 08:49

## 2020-01-03 RX ADMIN — CALCIUM GLUCONATE 1982 MG/HR: 94 INJECTION, SOLUTION INTRAVENOUS at 08:49

## 2020-01-03 NOTE — PROCEDURES
Transfusion Medicine/Apheresis Procedure    Deangelo Fish [id number]   YOB: 1955 Age: 64 year old   Date of procedure: 1/3/2020     Reason for consult: Allogeneic Hematopoietic Progenitor Cell (HPC)  Collection           Assessment and Plan:   The patient is a 64 year old male who presents for allogeneic HPC collection for his borther.  The plan is to collect for 1 to 3 days or until the target goal is met.   The patient does have adequate veins and did not require line placement.  He is tolerating the collection (day #1) without concerns/complaints.  He is sleeping now.  Per nursing he reports having a recent flu shot (approximately 2 weeks ago).  His hypertension seems to be better controlled on meds.  Still somewhat elevated BP readings though.  Continue with plan as per BMT.          Chief Complaint:   No specific complaints today..         History of Present Illness:   64 year old male presents for allogeneic HPC  collection.  He recently had a flu shot. Also, he started anti-hypertensive meds a couple weeks ago and this seems to have helped his high BP which was noted earlier with the consult for collection.             Past Medical History:   Hypertension          Past Surgical History:   The patient does not recall any significant surgical history           Social History:   Manages a [employer]         Allergies:   No Known Allergies          Medications:     Current Outpatient Medications   Medication Sig     chlorthalidone (HYGROTON) 25 MG tablet Take 25 mg by mouth daily     Current Facility-Administered Medications   Medication     filgrastim (NEUPOGEN) 1,080 mcg     heparin 100 UNIT/ML injection 1 mL            Vital Signs:     Vitals:    01/03/20 0818   BP: (!) 152/84   Pulse: 80   Resp: 16   Temp: 98.8  F (37.1  C)   TempSrc: Oral   Weight: 99.1 kg (218 lb 7.6 oz)               Data:       ABO   Date Value Ref Range Status   11/19/2019 B  Final     RH(D)   Date Value Ref  Range Status   11/19/2019 Pos  Final     Antibody Screen   Date Value Ref Range Status   11/19/2019 Neg  Final     CBC  Recent Labs   Lab 01/03/20  0840   WBC 69.4*   RBC 4.92   HGB 14.6   HCT 43.1   MCV 88   MCH 29.7   MCHC 33.9   RDW 13.2        CD34 = pending      Attestation: During the procedure the patient was directly seen and evaluated by me, Nilesh Falcon M.D..    Nilesh Falcon M.D.  Professor, Transfusion Medicine  Laboratory Medicine & Pathology  Pager: [phone]

## 2020-01-03 NOTE — PROGRESS NOTES
BMT Clinic Note     ID: Mr. Fish is a 65yo male who is undergoing GCSF primed stem cell collections for his brother. Today is day 5 gcsf, first day collections.        HPI:  Deangelo presents for first day collections today. Feeling well, no recent URI or other cold/fevers. No rashes, bruises or bleeding. On Chlorthiazide 25mg/day for HTN. No headaches. Minimal bone pain.  Review of Systems: 10 point ROS negative except as noted above.    Physical Exam:   Vs reviewed in apheresis, HTN to 152/84  General: NAD   Eyes: ELVI, sclera anicteric   Nose/Mouth/Throat: OP clear, buccal mucosa moist, no ulcerations   Lungs: CTA bilaterally  Cardiovascular: RRR, no M/R/G   Abdominal/Rectal: +BS, soft, NT, ND  Lymphatics: no edema  Skin: no rashes or petechaie  Neuro: A&O   Labs:  Lab Results   Component Value Date    WBC 4.8 11/19/2019    ANEU 3.1 11/19/2019    HGB 15.2 11/19/2019    HCT 45.7 11/19/2019     11/19/2019     11/19/2019    POTASSIUM 4.0 11/19/2019    CHLORIDE 107 11/19/2019    CO2 27 11/19/2019     (H) 11/19/2019    BUN 19 11/19/2019    CR 0.93 11/19/2019    INR 0.98 11/19/2019     Assessment and Plan:  Healthy donor undergoing GCSF, today starts stem cell collections.  1.  BMT: Began gcsf 12/30, today is day 5/first day collections.  2.  ID:  Repeat IDMs neg.   3.  CV:  HTN: on Chlorthiazide 25mg/day. BP stable to end of run, no changes today.    Pt will return tomorrow to review collections, possible second day collection.    Lola Garcia PAC  [phone]

## 2020-01-03 NOTE — DISCHARGE INSTRUCTIONS
Allogeneic Donor Collection:  Sometimes following the procedure, your blood platelet count may be low.  If you are told your platelet count is low, you need to avoid taking aspirin/aspirin containing products and avoid heavy physical activity and activities that may result in bruising or traumatic injury.  Related donors will receive a call from a Bone Marrow Transplant Coordinator who will tell you if you need to return for an additional collection.  Remember to follow a low-fat diet if you will need another collection.  To contact the BMT fellow or attending physician after 5 p.m. call [phone].     If your collection procedures are complete, the Coordinator may arrange to have a nurse on the Transplant station (5C) remove the angiocatheter (peripheral IV) tonight.     Instructions for indwelling peripheral line: The angiocatheter (peripheral IV) used to return the blood cells to you, may be use again tomorrow. If the peripheral IV needs to remain in place overnight, please follow the instructions below:  1. Keep the bandage in place.  2. Keep the dressing and catheter site clean and dry.  3. Do not do any lifting using the arm where the angiocatheter (peripheral IV) is placed.  4. If it becomes dislodged, you may remove it and apply pressure until the bleeding has stopped. Then apply a band-aid to the site.  (We will give you a package with a gauze pad and band-aid).  5. If you have any questions or concerns, call [phone] between the hours of 7:30 a.m. to 5 p.m.  After 5 p.m., you can page the Apheresis RN staff on call at [phone].

## (undated) RX ORDER — CALCIUM GLUCONATE 94 MG/ML
INJECTION, SOLUTION INTRAVENOUS
Status: DISPENSED
Start: 2020-01-03